# Patient Record
Sex: FEMALE | Race: WHITE | NOT HISPANIC OR LATINO | Employment: FULL TIME | URBAN - METROPOLITAN AREA
[De-identification: names, ages, dates, MRNs, and addresses within clinical notes are randomized per-mention and may not be internally consistent; named-entity substitution may affect disease eponyms.]

---

## 2017-04-11 PROBLEM — J35.3 ADENOTONSILLAR HYPERTROPHY: Status: ACTIVE | Noted: 2017-04-11

## 2018-01-04 PROBLEM — J35.3 ADENOTONSILLAR HYPERTROPHY: Status: RESOLVED | Noted: 2017-04-11 | Resolved: 2018-01-04

## 2018-01-04 PROBLEM — E28.2 PCOS (POLYCYSTIC OVARIAN SYNDROME): Status: ACTIVE | Noted: 2018-01-04

## 2018-03-29 ENCOUNTER — OFFICE VISIT (OUTPATIENT)
Dept: URGENT CARE | Facility: CLINIC | Age: 23
End: 2018-03-29
Payer: COMMERCIAL

## 2018-03-29 VITALS
OXYGEN SATURATION: 99 % | BODY MASS INDEX: 30.82 KG/M2 | TEMPERATURE: 98 F | WEIGHT: 185 LBS | SYSTOLIC BLOOD PRESSURE: 136 MMHG | HEART RATE: 75 BPM | DIASTOLIC BLOOD PRESSURE: 89 MMHG | HEIGHT: 65 IN | RESPIRATION RATE: 16 BRPM

## 2018-03-29 DIAGNOSIS — J30.1 CHRONIC SEASONAL ALLERGIC RHINITIS DUE TO POLLEN: Primary | ICD-10-CM

## 2018-03-29 PROCEDURE — 96372 THER/PROPH/DIAG INJ SC/IM: CPT | Mod: S$GLB,,, | Performed by: FAMILY MEDICINE

## 2018-03-29 PROCEDURE — 99213 OFFICE O/P EST LOW 20 MIN: CPT | Mod: 25,S$GLB,, | Performed by: NURSE PRACTITIONER

## 2018-03-29 RX ORDER — FLUTICASONE PROPIONATE 50 MCG
2 SPRAY, SUSPENSION (ML) NASAL DAILY
Qty: 1 BOTTLE | Refills: 1 | Status: SHIPPED | OUTPATIENT
Start: 2018-03-29

## 2018-03-29 RX ORDER — BETAMETHASONE SODIUM PHOSPHATE AND BETAMETHASONE ACETATE 3; 3 MG/ML; MG/ML
6 INJECTION, SUSPENSION INTRA-ARTICULAR; INTRALESIONAL; INTRAMUSCULAR; SOFT TISSUE
Status: COMPLETED | OUTPATIENT
Start: 2018-03-29 | End: 2018-03-29

## 2018-03-29 RX ORDER — PROMETHAZINE HYDROCHLORIDE 6.25 MG/5ML
6.25 SYRUP ORAL
Qty: 120 ML | Refills: 1 | Status: SHIPPED | OUTPATIENT
Start: 2018-03-29 | End: 2018-04-12

## 2018-03-29 RX ADMIN — BETAMETHASONE SODIUM PHOSPHATE AND BETAMETHASONE ACETATE 6 MG: 3; 3 INJECTION, SUSPENSION INTRA-ARTICULAR; INTRALESIONAL; INTRAMUSCULAR; SOFT TISSUE at 12:03

## 2018-03-29 NOTE — PROGRESS NOTES
"Subjective:       Patient ID: Trista Ayala is a 22 y.o. female.    Vitals:  height is 5' 5" (1.651 m) and weight is 83.9 kg (185 lb). Her temperature is 98 °F (36.7 °C). Her blood pressure is 136/89 and her pulse is 75. Her respiration is 16 and oxygen saturation is 99%.     Chief Complaint: URI (pt said she has been sick for a few weeks)    flonase in the past, states she has very bad sinus issues. Trying to avoid sinus surgery.  Has used benadryl at night to help sleep, cough worse at night      URI    This is a new problem. The current episode started 1 to 4 weeks ago. The problem has been unchanged. There has been no fever. Associated symptoms include congestion, coughing, ear pain, nausea, rhinorrhea and a sore throat. Pertinent negatives include no abdominal pain, chest pain, headaches, sinus pain or wheezing. She has tried antihistamine and decongestant for the symptoms. The treatment provided no relief.     Review of Systems   Constitution: Negative for chills, fever and malaise/fatigue.   HENT: Positive for congestion, ear pain, rhinorrhea and sore throat. Negative for hoarse voice and sinus pain.    Eyes: Negative for discharge and redness.   Cardiovascular: Negative for chest pain, dyspnea on exertion and leg swelling.   Respiratory: Positive for cough and sputum production. Negative for shortness of breath and wheezing.    Musculoskeletal: Negative for myalgias.   Gastrointestinal: Positive for nausea. Negative for abdominal pain.   Neurological: Negative for headaches.       Objective:      Physical Exam   Constitutional: She is oriented to person, place, and time. She appears well-developed and well-nourished. She is cooperative.  Non-toxic appearance. She does not appear ill. No distress.   HENT:   Head: Normocephalic and atraumatic.   Right Ear: Hearing, external ear and ear canal normal. Tympanic membrane is bulging. A middle ear effusion is present.   Left Ear: Hearing, external ear and " ear canal normal. A middle ear effusion is present.   Nose: Nose normal. No mucosal edema, rhinorrhea or nasal deformity. No epistaxis. Right sinus exhibits no maxillary sinus tenderness and no frontal sinus tenderness. Left sinus exhibits no maxillary sinus tenderness and no frontal sinus tenderness.   Mouth/Throat: Uvula is midline and mucous membranes are normal. No trismus in the jaw. Normal dentition. No uvula swelling. Posterior oropharyngeal erythema present.   Eyes: Conjunctivae and lids are normal. No scleral icterus.   Sclera clear bilat   Neck: Trachea normal, full passive range of motion without pain and phonation normal. Neck supple.   Cardiovascular: Normal rate, regular rhythm, normal heart sounds, intact distal pulses and normal pulses.    Pulmonary/Chest: Effort normal and breath sounds normal. No respiratory distress.   Abdominal: Soft. Normal appearance and bowel sounds are normal. She exhibits no distension. There is no tenderness.   Musculoskeletal: Normal range of motion. She exhibits no edema or deformity.   Neurological: She is alert and oriented to person, place, and time. She exhibits normal muscle tone. Coordination normal.   Skin: Skin is warm, dry and intact. She is not diaphoretic. No pallor.   Psychiatric: She has a normal mood and affect. Her speech is normal and behavior is normal. Judgment and thought content normal. Cognition and memory are normal.   Nursing note and vitals reviewed.      Assessment:       1. Chronic seasonal allergic rhinitis due to pollen        Plan:         Chronic seasonal allergic rhinitis due to pollen    Other orders  -     fluticasone (FLONASE) 50 mcg/actuation nasal spray; 2 sprays (100 mcg total) by Each Nare route once daily.  Dispense: 1 Bottle; Refill: 1  -     promethazine (PHENERGAN) 6.25 mg/5 mL syrup; Take 5 mLs (6.25 mg total) by mouth every 4 to 6 hours as needed for Nausea (cough).  Dispense: 120 mL; Refill: 1  -     betamethasone  acetate-betamethasone sodium phosphate injection 6 mg; Inject 1 mL (6 mg total) into the muscle one time.    discussed home relief and she may need to follow with ENT if symptoms persist

## 2018-03-29 NOTE — PATIENT INSTRUCTIONS
Symptomatic treatment: (consider the following unless you are allergic or cannot take the following suggestions)  Alternate Tylenol and Ibuprofen every 3 hrs for pain and fever control   For a sore throat try salt water gargles, honey/lemon water to soothe throat  Cepachol helps to numb the discomfort in throat  Cold-eeze helps to reduce the duration of Upper Respiratory Infection symptoms  Elderberry to reduce duration of URI symptoms  Nasal saline spray reduces inflammation and dryness  Warm face compresses/hot showers as often as you can to open sinuses   Vicks vapor rub at night  Flonase OTC or Nasacort OTC (nasal steroid)  Stay hydrated with increased water intake and simple foods like chicken noodle soup help hydrate and soothe the throat  Drink pedialyte, gatorade or propel.   Delsym helps with coughing at night  Zyrtec/Claritin during the day   Benadryl at night may help if allergy component   You can try breathe right strips at night to help you breathe   A cool mist humidifier in bedroom may help with cough and relieve stuffy nose.   Zantac will help if there is reflux from the post nasal drip  REST ReSt Rest as much as you can =)    Sinus rinses DO NOT USE TAP WATER, if you must, water must be a rolling boil for 1 minute, let it cool, then use.  May use distilled water, or over the counter nasal saline rinses.  Vics vapor rub in shower to help open nasal passages.  May use nasal gel to keep passages moisturized.  May use Nasal saline sprays during the day for added relief of congestion.   For those who go to the gym, please do not use the sauna or steam room now to clear sinuses.    During pollen season, change shirt if you are outside for a while when you go in.  Also wash your face.  Do not touch your face with your hands.  Wash your hands often in general while ill, avoid face contact with hands.     Your symptoms will likely last 5-7 days, maybe longer depending on how it affects your body.  You are  contagious 5-7, so minimize contact with others to reduce the spread to others and stay home from work or school as we discussed. Dehydration is preventable but is one of the main reasons why you will feel so badly.  Antibiotics are not needed unless a complication (such as Otitis Media, Bacterial sinus infection or pneumonia). Taking antibiotics for Flu/Cold is not supported by evidencebased medicine and can expose you to unnecessary side effects of the medication, such as anaphylaxis.   If you experience any:  Chest pain, shortness of breath, wheezing or difficulty breathing  Severe headache, face, neck or ear pain  New rash  Fever over 101.5º F (38.6 C) for more than three days  Confusion, behavior change or seizure  Severe weakness or dizziness  Go to ER

## 2018-08-15 PROBLEM — H60.92 OTITIS EXTERNA OF LEFT EAR: Status: ACTIVE | Noted: 2018-08-15

## 2018-08-15 PROBLEM — H72.92 PERFORATION OF LEFT TYMPANIC MEMBRANE: Status: ACTIVE | Noted: 2018-08-15

## 2018-08-15 PROBLEM — H65.02 ACUTE SEROUS OTITIS MEDIA OF LEFT EAR: Status: ACTIVE | Noted: 2018-08-15

## 2022-08-29 PROBLEM — Z34.90 ENCOUNTER FOR PLANNED INDUCTION OF LABOR: Status: ACTIVE | Noted: 2022-08-29

## 2022-11-20 PROBLEM — J45.990 ASTHMA, EXERCISE INDUCED: Status: ACTIVE | Noted: 2022-11-20

## 2023-03-10 ENCOUNTER — OFFICE VISIT (OUTPATIENT)
Dept: FAMILY MEDICINE | Facility: CLINIC | Age: 28
End: 2023-03-10
Payer: COMMERCIAL

## 2023-03-10 ENCOUNTER — LAB VISIT (OUTPATIENT)
Dept: LAB | Facility: HOSPITAL | Age: 28
End: 2023-03-10
Payer: COMMERCIAL

## 2023-03-10 VITALS
BODY MASS INDEX: 36.8 KG/M2 | HEIGHT: 65 IN | HEART RATE: 80 BPM | SYSTOLIC BLOOD PRESSURE: 124 MMHG | OXYGEN SATURATION: 96 % | DIASTOLIC BLOOD PRESSURE: 82 MMHG | WEIGHT: 220.88 LBS

## 2023-03-10 DIAGNOSIS — R53.83 FATIGUE, UNSPECIFIED TYPE: ICD-10-CM

## 2023-03-10 DIAGNOSIS — R53.83 FATIGUE, UNSPECIFIED TYPE: Primary | ICD-10-CM

## 2023-03-10 DIAGNOSIS — F41.9 ANXIETY: ICD-10-CM

## 2023-03-10 PROCEDURE — 3008F BODY MASS INDEX DOCD: CPT | Mod: CPTII,S$GLB,, | Performed by: PHYSICIAN ASSISTANT

## 2023-03-10 PROCEDURE — 3008F PR BODY MASS INDEX (BMI) DOCUMENTED: ICD-10-PCS | Mod: CPTII,S$GLB,, | Performed by: PHYSICIAN ASSISTANT

## 2023-03-10 PROCEDURE — 82728 ASSAY OF FERRITIN: CPT | Performed by: PHYSICIAN ASSISTANT

## 2023-03-10 PROCEDURE — 3074F SYST BP LT 130 MM HG: CPT | Mod: CPTII,S$GLB,, | Performed by: PHYSICIAN ASSISTANT

## 2023-03-10 PROCEDURE — 99999 PR PBB SHADOW E&M-EST. PATIENT-LVL III: CPT | Mod: PBBFAC,,, | Performed by: PHYSICIAN ASSISTANT

## 2023-03-10 PROCEDURE — 36415 COLL VENOUS BLD VENIPUNCTURE: CPT | Mod: PO | Performed by: PHYSICIAN ASSISTANT

## 2023-03-10 PROCEDURE — 84443 ASSAY THYROID STIM HORMONE: CPT | Performed by: PHYSICIAN ASSISTANT

## 2023-03-10 PROCEDURE — 80053 COMPREHEN METABOLIC PANEL: CPT | Performed by: PHYSICIAN ASSISTANT

## 2023-03-10 PROCEDURE — 85025 COMPLETE CBC W/AUTO DIFF WBC: CPT | Performed by: PHYSICIAN ASSISTANT

## 2023-03-10 PROCEDURE — 99204 OFFICE O/P NEW MOD 45 MIN: CPT | Mod: S$GLB,,, | Performed by: PHYSICIAN ASSISTANT

## 2023-03-10 PROCEDURE — 3079F PR MOST RECENT DIASTOLIC BLOOD PRESSURE 80-89 MM HG: ICD-10-PCS | Mod: CPTII,S$GLB,, | Performed by: PHYSICIAN ASSISTANT

## 2023-03-10 PROCEDURE — 80061 LIPID PANEL: CPT | Performed by: PHYSICIAN ASSISTANT

## 2023-03-10 PROCEDURE — 3074F PR MOST RECENT SYSTOLIC BLOOD PRESSURE < 130 MM HG: ICD-10-PCS | Mod: CPTII,S$GLB,, | Performed by: PHYSICIAN ASSISTANT

## 2023-03-10 PROCEDURE — 99204 PR OFFICE/OUTPT VISIT, NEW, LEVL IV, 45-59 MIN: ICD-10-PCS | Mod: S$GLB,,, | Performed by: PHYSICIAN ASSISTANT

## 2023-03-10 PROCEDURE — 1159F MED LIST DOCD IN RCRD: CPT | Mod: CPTII,S$GLB,, | Performed by: PHYSICIAN ASSISTANT

## 2023-03-10 PROCEDURE — 3079F DIAST BP 80-89 MM HG: CPT | Mod: CPTII,S$GLB,, | Performed by: PHYSICIAN ASSISTANT

## 2023-03-10 PROCEDURE — 99999 PR PBB SHADOW E&M-EST. PATIENT-LVL III: ICD-10-PCS | Mod: PBBFAC,,, | Performed by: PHYSICIAN ASSISTANT

## 2023-03-10 PROCEDURE — 84466 ASSAY OF TRANSFERRIN: CPT | Performed by: PHYSICIAN ASSISTANT

## 2023-03-10 PROCEDURE — 1159F PR MEDICATION LIST DOCUMENTED IN MEDICAL RECORD: ICD-10-PCS | Mod: CPTII,S$GLB,, | Performed by: PHYSICIAN ASSISTANT

## 2023-03-10 NOTE — PROGRESS NOTES
"Subjective:      Patient ID: Trista Negrete is a 27 y.o. female.    Chief Complaint: Fatigue (Since the baby he is 6 months, has gotten worse the past month)    Patient is new to me and clinic.    HPI  Patient has not had a recent PCP.    Patient reports right leg pain, but has resolved currently.    Patient reports worsening fatigue for a month.    Patient reports increased anxiety in certain instances.  Has noticed this in a car.   is noticing this.  She reports possible panic attacks with chest pain, elevated heart rate, and crying-happened last three nights ago.    Patient is breastfeeding currently.  No period since baby.    Not exercising daily.    Reviewed past medical, surgical, social, and family history with patient.    Review of Systems   Constitutional:  Positive for fatigue. Negative for appetite change, chills and fever.   Respiratory:  Negative for shortness of breath.    Cardiovascular:  Positive for chest pain (possible panic attacks).   Gastrointestinal:  Negative for abdominal pain, constipation, diarrhea, nausea and vomiting.   Neurological:  Negative for headaches.   Psychiatric/Behavioral:  Positive for decreased concentration. Negative for agitation, dysphoric mood, sleep disturbance and suicidal ideas. The patient is nervous/anxious.        Objective:   /82   Pulse 80   Ht 5' 5" (1.651 m)   Wt 100.2 kg (220 lb 14.4 oz)   SpO2 96%   Breastfeeding Yes   BMI 36.76 kg/m²     Physical Exam  Vitals reviewed.   Constitutional:       Appearance: Normal appearance. She is well-developed.   HENT:      Head: Normocephalic and atraumatic.      Right Ear: External ear normal.      Left Ear: External ear normal.   Eyes:      Conjunctiva/sclera: Conjunctivae normal.   Cardiovascular:      Rate and Rhythm: Normal rate and regular rhythm.      Heart sounds: Normal heart sounds. No murmur heard.    No friction rub. No gallop.   Pulmonary:      Effort: Pulmonary effort is normal. No " respiratory distress.      Breath sounds: Normal breath sounds. No wheezing, rhonchi or rales.   Musculoskeletal:         General: Normal range of motion.   Skin:     General: Skin is warm and dry.      Findings: No rash.   Neurological:      General: No focal deficit present.      Mental Status: She is alert and oriented to person, place, and time.   Psychiatric:         Mood and Affect: Mood normal.         Behavior: Behavior normal.         Thought Content: Thought content normal.         Judgment: Judgment normal.     Assessment:      1. Fatigue, unspecified type    2. Anxiety       Plan:   1. Fatigue, unspecified type  Bloodwork scheduled.  - CBC Auto Differential; Future  - Comprehensive Metabolic Panel; Future  - TSH; Future  - Lipid Panel; Future  - IRON AND TIBC; Future  - FERRITIN; Future    2. Anxiety  Continue to monitor.    Follow up in 2 weeks with me.  Patient agreed with plan and expressed understanding.    Thank you for allowing me to serve you,

## 2023-03-11 LAB
ALBUMIN SERPL BCP-MCNC: 3.8 G/DL (ref 3.5–5.2)
ALP SERPL-CCNC: 117 U/L (ref 55–135)
ALT SERPL W/O P-5'-P-CCNC: 17 U/L (ref 10–44)
ANION GAP SERPL CALC-SCNC: 9 MMOL/L (ref 8–16)
AST SERPL-CCNC: 14 U/L (ref 10–40)
BASOPHILS # BLD AUTO: 0.04 K/UL (ref 0–0.2)
BASOPHILS NFR BLD: 0.4 % (ref 0–1.9)
BILIRUB SERPL-MCNC: 0.3 MG/DL (ref 0.1–1)
BUN SERPL-MCNC: 11 MG/DL (ref 6–20)
CALCIUM SERPL-MCNC: 9.6 MG/DL (ref 8.7–10.5)
CHLORIDE SERPL-SCNC: 104 MMOL/L (ref 95–110)
CHOLEST SERPL-MCNC: 162 MG/DL (ref 120–199)
CHOLEST/HDLC SERPL: 2.5 {RATIO} (ref 2–5)
CO2 SERPL-SCNC: 25 MMOL/L (ref 23–29)
CREAT SERPL-MCNC: 0.8 MG/DL (ref 0.5–1.4)
DIFFERENTIAL METHOD: NORMAL
EOSINOPHIL # BLD AUTO: 0.1 K/UL (ref 0–0.5)
EOSINOPHIL NFR BLD: 0.6 % (ref 0–8)
ERYTHROCYTE [DISTWIDTH] IN BLOOD BY AUTOMATED COUNT: 13.7 % (ref 11.5–14.5)
EST. GFR  (NO RACE VARIABLE): >60 ML/MIN/1.73 M^2
FERRITIN SERPL-MCNC: 31 NG/ML (ref 20–300)
GLUCOSE SERPL-MCNC: 132 MG/DL (ref 70–110)
HCT VFR BLD AUTO: 40.5 % (ref 37–48.5)
HDLC SERPL-MCNC: 64 MG/DL (ref 40–75)
HDLC SERPL: 39.5 % (ref 20–50)
HGB BLD-MCNC: 13.2 G/DL (ref 12–16)
IMM GRANULOCYTES # BLD AUTO: 0.03 K/UL (ref 0–0.04)
IMM GRANULOCYTES NFR BLD AUTO: 0.3 % (ref 0–0.5)
IRON SERPL-MCNC: 53 UG/DL (ref 30–160)
LDLC SERPL CALC-MCNC: 72.2 MG/DL (ref 63–159)
LYMPHOCYTES # BLD AUTO: 3.2 K/UL (ref 1–4.8)
LYMPHOCYTES NFR BLD: 32.9 % (ref 18–48)
MCH RBC QN AUTO: 27.9 PG (ref 27–31)
MCHC RBC AUTO-ENTMCNC: 32.6 G/DL (ref 32–36)
MCV RBC AUTO: 86 FL (ref 82–98)
MONOCYTES # BLD AUTO: 0.6 K/UL (ref 0.3–1)
MONOCYTES NFR BLD: 6.4 % (ref 4–15)
NEUTROPHILS # BLD AUTO: 5.7 K/UL (ref 1.8–7.7)
NEUTROPHILS NFR BLD: 59.4 % (ref 38–73)
NONHDLC SERPL-MCNC: 98 MG/DL
NRBC BLD-RTO: 0 /100 WBC
PLATELET # BLD AUTO: 330 K/UL (ref 150–450)
PMV BLD AUTO: 11 FL (ref 9.2–12.9)
POTASSIUM SERPL-SCNC: 3.8 MMOL/L (ref 3.5–5.1)
PROT SERPL-MCNC: 7.4 G/DL (ref 6–8.4)
RBC # BLD AUTO: 4.73 M/UL (ref 4–5.4)
SATURATED IRON: 13 % (ref 20–50)
SODIUM SERPL-SCNC: 138 MMOL/L (ref 136–145)
TOTAL IRON BINDING CAPACITY: 398 UG/DL (ref 250–450)
TRANSFERRIN SERPL-MCNC: 269 MG/DL (ref 200–375)
TRIGL SERPL-MCNC: 129 MG/DL (ref 30–150)
TSH SERPL DL<=0.005 MIU/L-ACNC: 1.55 UIU/ML (ref 0.4–4)
WBC # BLD AUTO: 9.63 K/UL (ref 3.9–12.7)

## 2023-03-15 ENCOUNTER — PATIENT MESSAGE (OUTPATIENT)
Dept: FAMILY MEDICINE | Facility: CLINIC | Age: 28
End: 2023-03-15
Payer: COMMERCIAL

## 2023-03-19 PROBLEM — Z34.90 ENCOUNTER FOR PLANNED INDUCTION OF LABOR: Status: RESOLVED | Noted: 2022-08-29 | Resolved: 2023-03-19

## 2023-03-24 ENCOUNTER — OFFICE VISIT (OUTPATIENT)
Dept: FAMILY MEDICINE | Facility: CLINIC | Age: 28
End: 2023-03-24
Payer: COMMERCIAL

## 2023-03-24 ENCOUNTER — TELEPHONE (OUTPATIENT)
Dept: FAMILY MEDICINE | Facility: CLINIC | Age: 28
End: 2023-03-24
Payer: COMMERCIAL

## 2023-03-24 DIAGNOSIS — F41.9 ANXIETY: Primary | ICD-10-CM

## 2023-03-24 PROCEDURE — 99213 OFFICE O/P EST LOW 20 MIN: CPT | Mod: 95,,, | Performed by: PHYSICIAN ASSISTANT

## 2023-03-24 PROCEDURE — 99213 PR OFFICE/OUTPT VISIT, EST, LEVL III, 20-29 MIN: ICD-10-PCS | Mod: 95,,, | Performed by: PHYSICIAN ASSISTANT
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
## 2023-05-23 ENCOUNTER — TELEPHONE (OUTPATIENT)
Dept: FAMILY MEDICINE | Facility: CLINIC | Age: 28
End: 2023-05-23
Payer: COMMERCIAL

## 2023-09-22 ENCOUNTER — TELEPHONE (OUTPATIENT)
Dept: FAMILY MEDICINE | Facility: CLINIC | Age: 28
End: 2023-09-22
Payer: COMMERCIAL

## 2023-09-22 NOTE — TELEPHONE ENCOUNTER
Is there some hold spots  for these  canceled apt please let  me know I dont mine calling patient back .

## 2023-09-22 NOTE — TELEPHONE ENCOUNTER
Tried to call pt. No answer. Left message to return call   --need to agustin Monday appt.  will not be in clinic.

## 2023-11-13 ENCOUNTER — PATIENT MESSAGE (OUTPATIENT)
Dept: PSYCHIATRY | Facility: CLINIC | Age: 28
End: 2023-11-13
Payer: COMMERCIAL

## 2024-07-03 PROBLEM — O34.211 MATERNAL CARE DUE TO LOW TRANSVERSE UTERINE SCAR FROM PREVIOUS CESAREAN DELIVERY: Status: ACTIVE | Noted: 2024-07-03

## 2024-10-27 ENCOUNTER — OFFICE VISIT (OUTPATIENT)
Dept: URGENT CARE | Facility: CLINIC | Age: 29
End: 2024-10-27
Payer: COMMERCIAL

## 2024-10-27 VITALS
WEIGHT: 245 LBS | HEIGHT: 65 IN | TEMPERATURE: 98 F | BODY MASS INDEX: 40.82 KG/M2 | SYSTOLIC BLOOD PRESSURE: 138 MMHG | DIASTOLIC BLOOD PRESSURE: 87 MMHG | RESPIRATION RATE: 17 BRPM | HEART RATE: 86 BPM | OXYGEN SATURATION: 98 %

## 2024-10-27 DIAGNOSIS — U07.1 COVID-19 VIRUS INFECTION: Primary | ICD-10-CM

## 2024-10-27 DIAGNOSIS — R05.1 ACUTE COUGH: ICD-10-CM

## 2024-10-27 LAB
CTP QC/QA: YES
CTP QC/QA: YES
POC MOLECULAR INFLUENZA A AGN: NEGATIVE
POC MOLECULAR INFLUENZA B AGN: NEGATIVE
SARS-COV-2 AG RESP QL IA.RAPID: POSITIVE

## 2024-10-27 PROCEDURE — 87502 INFLUENZA DNA AMP PROBE: CPT | Mod: QW,S$GLB,, | Performed by: FAMILY MEDICINE

## 2024-10-27 PROCEDURE — 87811 SARS-COV-2 COVID19 W/OPTIC: CPT | Mod: QW,S$GLB,, | Performed by: FAMILY MEDICINE

## 2024-10-27 PROCEDURE — 99213 OFFICE O/P EST LOW 20 MIN: CPT | Mod: S$GLB,,, | Performed by: FAMILY MEDICINE

## 2024-10-27 RX ORDER — LEVOCETIRIZINE DIHYDROCHLORIDE 5 MG/1
5 TABLET, FILM COATED ORAL NIGHTLY
COMMUNITY

## 2024-11-02 ENCOUNTER — ON-DEMAND VIRTUAL (OUTPATIENT)
Dept: URGENT CARE | Facility: CLINIC | Age: 29
End: 2024-11-02
Payer: COMMERCIAL

## 2024-11-02 DIAGNOSIS — R05.9 COUGH, UNSPECIFIED TYPE: Primary | ICD-10-CM

## 2024-11-02 PROCEDURE — 99213 OFFICE O/P EST LOW 20 MIN: CPT | Mod: 95,,, | Performed by: NURSE PRACTITIONER

## 2024-11-02 RX ORDER — CEFDINIR 300 MG/1
300 CAPSULE ORAL EVERY 12 HOURS
Qty: 20 CAPSULE | Refills: 0 | Status: SHIPPED | OUTPATIENT
Start: 2024-11-02 | End: 2024-11-12

## 2025-04-01 ENCOUNTER — OFFICE VISIT (OUTPATIENT)
Dept: URGENT CARE | Facility: CLINIC | Age: 30
End: 2025-04-01
Payer: COMMERCIAL

## 2025-04-01 VITALS
SYSTOLIC BLOOD PRESSURE: 129 MMHG | RESPIRATION RATE: 18 BRPM | BODY MASS INDEX: 40.82 KG/M2 | HEART RATE: 82 BPM | OXYGEN SATURATION: 99 % | HEIGHT: 65 IN | DIASTOLIC BLOOD PRESSURE: 89 MMHG | TEMPERATURE: 98 F | WEIGHT: 245 LBS

## 2025-04-01 DIAGNOSIS — R42 DIZZINESS AFTER EXTENSION OF NECK: ICD-10-CM

## 2025-04-01 DIAGNOSIS — H83.01 LABYRINTHITIS OF RIGHT EAR: Primary | ICD-10-CM

## 2025-04-01 DIAGNOSIS — H65.91 MIDDLE EAR EFFUSION, RIGHT: ICD-10-CM

## 2025-04-01 PROCEDURE — 99213 OFFICE O/P EST LOW 20 MIN: CPT | Mod: S$GLB,,, | Performed by: PHYSICIAN ASSISTANT

## 2025-04-01 RX ORDER — OXYMETAZOLINE HCL 0.05 %
2 SPRAY, NON-AEROSOL (ML) NASAL 2 TIMES DAILY
Qty: 15 ML | Refills: 0 | Status: SHIPPED | OUTPATIENT
Start: 2025-04-01 | End: 2025-04-04

## 2025-04-01 NOTE — PROGRESS NOTES
"Subjective:      Patient ID: Trista Negrete is a 29 y.o. female.    Vitals:  height is 5' 5" (1.651 m) and weight is 111.1 kg (245 lb). Her oral temperature is 98.2 °F (36.8 °C). Her blood pressure is 129/89 and her pulse is 82. Her respiration is 18 and oxygen saturation is 99%.     Chief Complaint: Dizziness    Patient c/o dizziness onset last Tuesday. Pt stated she feels like she has to pop her ears. No pain    Dizziness:   Chronicity:  New  Onset:  In the past 7 days  Frequency:  Every few hours  Pain Scale:  0/10  Severity:  Mild  Duration:  1 minute  Dizziness characteristics:  Off-balance and lightheaded/impending faint   Associated symptoms: hearing loss, ear congestion and tinnitus.no ear pain, no fever, no headaches, no nausea, no vomiting, no diaphoresis, no aural fullness, no weakness, no visual disturbances, no light-headedness, no syncope, no palpitations, no panic, no facial weakness, no slurred speech, no numbness in extremities and no chest pain.  Aggravated by:  Getting up and position changes  Improvements on treatment:  No relief      Constitution: Negative for chills, sweating, fatigue and fever.   HENT:  Positive for tinnitus and hearing loss. Negative for ear pain, drooling, congestion, sore throat, trouble swallowing and voice change.    Neck: Negative for neck pain, neck stiffness, painful lymph nodes and neck swelling.   Cardiovascular:  Negative for chest pain, leg swelling, palpitations, sob on exertion and passing out.   Eyes:  Negative for eye pain, eye redness, photophobia, double vision, blurred vision and eyelid swelling.   Respiratory:  Negative for chest tightness, cough, sputum production, bloody sputum, shortness of breath, stridor and wheezing.    Gastrointestinal:  Negative for abdominal pain, abdominal bloating, nausea, vomiting, constipation, diarrhea and heartburn.   Musculoskeletal:  Negative for joint pain, joint swelling, abnormal ROM of joint, back pain, muscle " cramps and muscle ache.   Skin:  Negative for rash and hives.   Allergic/Immunologic: Negative for seasonal allergies, food allergies, hives, itching and sneezing.   Neurological:  Positive for dizziness. Negative for light-headedness, passing out, loss of balance, headaches, altered mental status, loss of consciousness and seizures.   Hematologic/Lymphatic: Negative for swollen lymph nodes.   Psychiatric/Behavioral:  Negative for altered mental status and nervous/anxious. The patient is not nervous/anxious.       Objective:     Physical Exam   Constitutional: She is oriented to person, place, and time. She appears well-developed. She is cooperative.   HENT:   Head: Normocephalic and atraumatic.   Ears:   Right Ear: Hearing, external ear and ear canal normal. Tympanic membrane is erythematous. A middle ear effusion is present.   Left Ear: Hearing, tympanic membrane, external ear and ear canal normal.   Nose: Nose normal. No mucosal edema or nasal deformity. No epistaxis. Right sinus exhibits no maxillary sinus tenderness and no frontal sinus tenderness. Left sinus exhibits no maxillary sinus tenderness and no frontal sinus tenderness.   Mouth/Throat: Uvula is midline, oropharynx is clear and moist and mucous membranes are normal. No trismus in the jaw. Normal dentition. No uvula swelling.   Eyes: Conjunctivae and lids are normal. Right eye exhibits no nystagmus. Left eye exhibits no nystagmus. Extraocular movement intact   Neck: Trachea normal and phonation normal. Neck supple.   Cardiovascular: Normal rate, regular rhythm, normal heart sounds and normal pulses.   Pulmonary/Chest: Effort normal and breath sounds normal.   Abdominal: Normal appearance and bowel sounds are normal. Soft.   Musculoskeletal: Normal range of motion.         General: Normal range of motion.   Neurological: She is alert and oriented to person, place, and time. She exhibits normal muscle tone.   Skin: Skin is warm, dry and intact.    Psychiatric: Her speech is normal and behavior is normal. Judgment and thought content normal.   Nursing note and vitals reviewed.      Assessment:     1. Labyrinthitis of right ear    2. Dizziness after extension of neck    3. Middle ear effusion, right        Plan:       Labyrinthitis of right ear  -     naproxen-pseudoephedrine 220-120 mg Tb12; Take 1 tablet by mouth once daily. for 10 days  Dispense: 12 each; Refill: 0    Dizziness after extension of neck  -     naproxen-pseudoephedrine 220-120 mg Tb12; Take 1 tablet by mouth once daily. for 10 days  Dispense: 12 each; Refill: 0    Middle ear effusion, right    Other orders  -     oxymetazoline (AFRIN) 0.05 % nasal spray; 2 sprays by Nasal route 2 (two) times daily. for 3 days  Dispense: 15 mL; Refill: 0        INSTRUCTIONS:  - Rest.  - Drink plenty of fluids.  - Take Tylenol and/or Ibuprofen as directed as needed for fever/pain.  Do not take more than the recommended dose.  - follow up with your PCP within the next 1-2 weeks as needed.  - You must understand that you have received an Urgent Care treatment only and that you may be released before all of your medical problems are known or treated.   - You, the patient, will arrange for follow up care as instructed.   - If your condition worsens or fails to improve we recommend that you receive another evaluation at the ER immediately or contact your PCP to discuss your concerns.   - You can call [phone] or [phone] to help schedule an appointment with the appropriate provider.     -If you smoke cigarettes, it would be beneficial for you to stop.

## 2025-04-14 ENCOUNTER — LAB VISIT (OUTPATIENT)
Dept: LAB | Facility: HOSPITAL | Age: 30
End: 2025-04-14
Payer: COMMERCIAL

## 2025-04-14 ENCOUNTER — OFFICE VISIT (OUTPATIENT)
Dept: FAMILY MEDICINE | Facility: CLINIC | Age: 30
End: 2025-04-14
Payer: COMMERCIAL

## 2025-04-14 VITALS
OXYGEN SATURATION: 97 % | HEART RATE: 91 BPM | DIASTOLIC BLOOD PRESSURE: 90 MMHG | SYSTOLIC BLOOD PRESSURE: 124 MMHG | BODY MASS INDEX: 42.98 KG/M2 | WEIGHT: 258.25 LBS

## 2025-04-14 DIAGNOSIS — H60.93 OTITIS EXTERNA OF BOTH EARS, UNSPECIFIED CHRONICITY, UNSPECIFIED TYPE: ICD-10-CM

## 2025-04-14 DIAGNOSIS — G43.009 MIGRAINE WITHOUT AURA AND WITHOUT STATUS MIGRAINOSUS, NOT INTRACTABLE: ICD-10-CM

## 2025-04-14 DIAGNOSIS — J30.9 ALLERGIC RHINITIS, UNSPECIFIED SEASONALITY, UNSPECIFIED TRIGGER: Primary | ICD-10-CM

## 2025-04-14 DIAGNOSIS — Z00.00 PREVENTATIVE HEALTH CARE: ICD-10-CM

## 2025-04-14 DIAGNOSIS — E66.01 SEVERE OBESITY: ICD-10-CM

## 2025-04-14 LAB
ABSOLUTE EOSINOPHIL (OHS): 0.11 K/UL
ABSOLUTE MONOCYTE (OHS): 0.64 K/UL (ref 0.3–1)
ABSOLUTE NEUTROPHIL COUNT (OHS): 6.75 K/UL (ref 1.8–7.7)
ALBUMIN SERPL BCP-MCNC: 3.3 G/DL (ref 3.5–5.2)
ALP SERPL-CCNC: 95 UNIT/L (ref 40–150)
ALT SERPL W/O P-5'-P-CCNC: 22 UNIT/L (ref 10–44)
ANION GAP (OHS): 8 MMOL/L (ref 8–16)
AST SERPL-CCNC: 19 UNIT/L (ref 11–45)
BASOPHILS # BLD AUTO: 0.06 K/UL
BASOPHILS NFR BLD AUTO: 0.6 %
BILIRUB SERPL-MCNC: 0.3 MG/DL (ref 0.1–1)
BUN SERPL-MCNC: 8 MG/DL (ref 6–20)
CALCIUM SERPL-MCNC: 9.2 MG/DL (ref 8.7–10.5)
CHLORIDE SERPL-SCNC: 107 MMOL/L (ref 95–110)
CHOLEST SERPL-MCNC: 154 MG/DL (ref 120–199)
CHOLEST/HDLC SERPL: 3.4 {RATIO} (ref 2–5)
CO2 SERPL-SCNC: 26 MMOL/L (ref 23–29)
CREAT SERPL-MCNC: 0.6 MG/DL (ref 0.5–1.4)
EAG (OHS): 100 MG/DL (ref 68–131)
ERYTHROCYTE [DISTWIDTH] IN BLOOD BY AUTOMATED COUNT: 12.9 % (ref 11.5–14.5)
GFR SERPLBLD CREATININE-BSD FMLA CKD-EPI: >60 ML/MIN/1.73/M2
GLUCOSE SERPL-MCNC: 97 MG/DL (ref 70–110)
HBA1C MFR BLD: 5.1 % (ref 4–5.6)
HCT VFR BLD AUTO: 40.5 % (ref 37–48.5)
HDLC SERPL-MCNC: 45 MG/DL (ref 40–75)
HDLC SERPL: 29.2 % (ref 20–50)
HGB BLD-MCNC: 13.3 GM/DL (ref 12–16)
IMM GRANULOCYTES # BLD AUTO: 0.06 K/UL (ref 0–0.04)
IMM GRANULOCYTES NFR BLD AUTO: 0.6 % (ref 0–0.5)
LDLC SERPL CALC-MCNC: 69.6 MG/DL (ref 63–159)
LYMPHOCYTES # BLD AUTO: 3.18 K/UL (ref 1–4.8)
MCH RBC QN AUTO: 29.2 PG (ref 27–31)
MCHC RBC AUTO-ENTMCNC: 32.8 G/DL (ref 32–36)
MCV RBC AUTO: 89 FL (ref 82–98)
NONHDLC SERPL-MCNC: 109 MG/DL
NUCLEATED RBC (/100WBC) (OHS): 0 /100 WBC
PLATELET # BLD AUTO: 341 K/UL (ref 150–450)
PMV BLD AUTO: 11 FL (ref 9.2–12.9)
POTASSIUM SERPL-SCNC: 4.4 MMOL/L (ref 3.5–5.1)
PROT SERPL-MCNC: 7.4 GM/DL (ref 6–8.4)
RBC # BLD AUTO: 4.55 M/UL (ref 4–5.4)
RELATIVE EOSINOPHIL (OHS): 1 %
RELATIVE LYMPHOCYTE (OHS): 29.4 % (ref 18–48)
RELATIVE MONOCYTE (OHS): 5.9 % (ref 4–15)
RELATIVE NEUTROPHIL (OHS): 62.5 % (ref 38–73)
SODIUM SERPL-SCNC: 141 MMOL/L (ref 136–145)
TRIGL SERPL-MCNC: 197 MG/DL (ref 30–150)
TSH SERPL-ACNC: 1.99 UIU/ML (ref 0.4–4)
WBC # BLD AUTO: 10.8 K/UL (ref 3.9–12.7)

## 2025-04-14 PROCEDURE — 36415 COLL VENOUS BLD VENIPUNCTURE: CPT | Mod: PO

## 2025-04-14 PROCEDURE — 85025 COMPLETE CBC W/AUTO DIFF WBC: CPT

## 2025-04-14 PROCEDURE — 99999 PR PBB SHADOW E&M-EST. PATIENT-LVL IV: CPT | Mod: PBBFAC,,,

## 2025-04-14 PROCEDURE — 84443 ASSAY THYROID STIM HORMONE: CPT

## 2025-04-14 PROCEDURE — 83036 HEMOGLOBIN GLYCOSYLATED A1C: CPT

## 2025-04-14 PROCEDURE — 80053 COMPREHEN METABOLIC PANEL: CPT

## 2025-04-14 PROCEDURE — 82465 ASSAY BLD/SERUM CHOLESTEROL: CPT

## 2025-04-14 RX ORDER — AZELASTINE 1 MG/ML
1 SPRAY, METERED NASAL 2 TIMES DAILY PRN
Qty: 30 ML | Refills: 0 | Status: SHIPPED | OUTPATIENT
Start: 2025-04-14 | End: 2026-04-14

## 2025-04-14 RX ORDER — CIPROFLOXACIN AND DEXAMETHASONE 3; 1 MG/ML; MG/ML
4 SUSPENSION/ DROPS AURICULAR (OTIC) 2 TIMES DAILY
Qty: 7.5 ML | Refills: 0 | Status: SHIPPED | OUTPATIENT
Start: 2025-04-14 | End: 2025-04-21

## 2025-04-14 RX ORDER — FLUTICASONE PROPIONATE 50 MCG
1 SPRAY, SUSPENSION (ML) NASAL DAILY PRN
Qty: 11.1 ML | Refills: 0 | Status: SHIPPED | OUTPATIENT
Start: 2025-04-14

## 2025-04-14 NOTE — PROGRESS NOTES
Patient ID: Trista Negrete is a 29 y.o. female.    Chief Complaint: Establish Care    History of Present Illness    HPI:  Trista presents for an annual wellness visit and to discuss concerns about high blood pressure and recurring headaches. Trista, a 9-month postpartum mother, reports concerns about her blood pressure. Her diastolic pressure is usually around 90 mmHg when checked at urgent care visits since giving birth, while her systolic pressure typically ranges between 120 and 130 mmHg. She was previously on Labetalol 100 mg, discontinued by her OB at about 6 weeks postpartum.    She reports headaches occurring at least once a week, lasting for 1-2 days. These headaches have been consistent since mid-pregnancy, more frequent than her previous occurrences. Associated symptoms include photophobia, phonophobia, nausea, and dizziness. Tylenol and ibuprofen provide inconsistent relief. She notes severe seasonal allergies, which may contribute to her headaches.    Trista complains of ear pain, particularly in the right ear, with a sensation of needing to equalize pressure. She was evaluated at urgent care 2 weeks ago for dizziness, attributed to fluid in her ear. She has a history of recurrent ear infections as a child and had ear tubes placed multiple times.    She denies any swollen lymph nodes in the neck area, pruritus in the ear, or muffled hearing.    MEDICATIONS:  Trista is on Labetalol 100 mg, which was discontinued approximately 6 weeks postpartum. She also uses an Albuterol inhaler as needed, typically when she is very sick.    MEDICAL HISTORY:  Trista has a history of PCOS (Polycystic ovary syndrome) and seasonal allergies. Trista has been pregnant twice (G2) and has given birth twice (P2). Both deliveries were via . She has a lactation history of 9 months.    FAMILY HISTORY:  Family history is significant for her paternal grandmother having colon cancer in her 90s. Her maternal  grandmother had breast cancer in her 60s. Trista's paternal aunt had breast cancer at age 50, lung cancer, a brain tumor, and two other cancers.    SURGICAL HISTORY:  She has undergone wisdom teeth extraction, tonsillectomy and adenoidectomy, two C-sections, and a cholecystectomy (gallbladder removal).    ALLERGIES:  Trista is allergic to Neosporin. She may also be allergic to Penicillin, as she experienced a rash as a child, but she is not 100% certain about this allergy.    SOCIAL HISTORY:  Occupation: Third grade math and      History:  is in the       ROS:  ENT: reports ear pain, reports ear pressure  Cardiovascular: denies lower extremity edema  Gastrointestinal: reports nausea  Neurological: reports headache, reports dizziness  Allergic: reports seasonal allergies         Problem List[1]    Medications Ordered Prior to Encounter[2]    Past Medical History:   Diagnosis Date    Migraine     Mononucleosis     PCOS (polycystic ovarian syndrome)     Strep throat        Past Surgical History:   Procedure Laterality Date    ADENOIDECTOMY       SECTION N/A 2022    Procedure:  SECTION;  Surgeon: Tracy Allen MD;  Location: [facility] L&D;  Service: OB/GYN;  Laterality: N/A;     SECTION N/A 2024    Procedure:  SECTION;  Surgeon: Tracy Allen MD;  Location: [facility] L&D;  Service: OB/GYN;  Laterality: N/A;     SECTION  22    CHOLECYSTECTOMY      TONSILLECTOMY      WISDOM TOOTH EXTRACTION          Family History   Problem Relation Name Age of Onset    Asthma Mother Marcella     Hypertension Father Jeff     No Known Problems Sister      Cancer Maternal Grandmother Trena         breast    Stroke Maternal Grandfather Nader     Cancer Paternal Grandmother [place] 90        colon    Hypertension Paternal Grandmother [place]     Stroke Paternal Grandmother Jeannie     Cancer Paternal Grandfather Mana     No Known Problems Son       Cancer Paternal Aunt Isatu         multiple- lung, brain    Breast cancer Paternal Aunt Isatu 50       Social History[3]    Review of patient's allergies indicates:   Allergen Reactions    Neosporin (auk-oxf-[id number]) [neomycin-bacitracnzn-polymyxnb]     Penicillins Rash        Health Maintenance Due   Topic Date Due    Hepatitis C Screening  Never done    HIV Screening  Never done    Influenza Vaccine (1) 09/01/2024    COVID-19 Vaccine (3 - 2024-25 season) 09/01/2024       Objective     Vitals:    04/14/25 0934   BP: (!) 124/90   Pulse: 91      Body mass index is 42.98 kg/m².     Physical Exam  Vitals and nursing note reviewed.   Constitutional:       General: She is not in acute distress.     Appearance: Normal appearance. She is not ill-appearing or toxic-appearing.   HENT:      Head: Normocephalic and atraumatic.      Nose: Nose normal.      Mouth/Throat:      Mouth: Mucous membranes are moist.   Eyes:      Extraocular Movements: Extraocular movements intact.   Cardiovascular:      Rate and Rhythm: Normal rate and regular rhythm.      Heart sounds: Normal heart sounds. No murmur heard.     No friction rub. No gallop.   Pulmonary:      Effort: Pulmonary effort is normal.      Breath sounds: Normal breath sounds. No wheezing, rhonchi or rales.   Musculoskeletal:      Cervical back: Neck supple.      Right lower leg: No edema.      Left lower leg: No edema.   Skin:     General: Skin is warm.   Neurological:      Mental Status: She is alert and oriented to person, place, and time.   Psychiatric:         Mood and Affect: Mood normal.         Behavior: Behavior normal.         Assessment & Plan    Considered family history of breast cancer and recommended regular breast self-exams while showering.  Evaluated reported elevated diastolic BP (around 90) and decided to monitor before potentially restarting low-dose Labetalol.  Assessed frequent headaches, considering potential causes including allergies, vision  changes, and possible migraines.  Examined ears due to complaint of pain and history of recurrent infections, noted redness and fluid.  Will treat possible ear infection with antibiotic ear drops, considering it as potential cause of headaches.    SEVERE OBESITY:  - Monitor patient's blood pressure, noting typical readings of 120-130/90.  - Instruct patient to check blood pressure at home multiple times daily for a few days to assess consistency of high readings.  - If blood pressure remains consistently above 130/80, consider prescribing low-dose Labetalol.  - Note that patient was previously on Labetalol 100 mg but was taken off by OB at 6 weeks postpartum.  - Advise patient to report if blood pressure consistently exceeds 130/80.    MIGRAINE:  - Trista reports headaches occurring once a week, lasting 1-2 days, with associated nausea, dizziness, and photosensitivity.  - Evaluate as migraine pattern (less than 15 days/month).  - Consider potential causes including allergies, vision changes, and otitis media.  - Prescribe Ondansetron for nausea.  - Refer to Neurology for evaluation and to Ophthalmology for routine exam to rule out vision abnormality as etiology.    OTITIS EXTERNA:  - Trista reports otalgia, ear popping sensation, and history of recurrent otitis media in childhood.  - Examination shows erythema and fluid in both ears, with minimal scar tissue in the right ear.  - Assess as otitis externa requiring antibiotic treatment.  - Prescribe Ciprofloxacin/Dexamethasone otic drops, 4 drops twice daily in both ears for 7 days (can discontinue after 5 days if symptoms improve).    POLYCYSTIC OVARIAN SYNDROME:  - Trista reports irregular menses due to PCOS and lactation.    SEASONAL ALLERGIC RHINITIS:  - Trista reports severe seasonal allergies, which may contribute to headaches.  - Prescribe Fluticasone and Azelastine nasal sprays, both to be administered daily.    FAMILY HISTORY OF BREAST CANCER:  - Family  history includes maternal grandmother (diagnosed in 60s) and paternal aunt (diagnosed at 50).  - Acknowledge increased risk and recommend closer monitoring.  - Advise regular breast self-exams, reporting of persistent changes, and mammogram screening starting at age 40, with possibility of earlier screening if needed.    FAMILY HISTORY OF COLON CANCER:  - Paternal grandmother diagnosed in her 90s.  - Acknowledge family history but find it reassuring due to late age of onset.    DRUG ALLERGIES:  - Trista reports penicillin allergy from childhood (rash), and allergies to Neomycin/Polymyxin B/Bacitracin and sulfur.  - Consider these allergies when prescribing medications.    FOLLOW-UP AND MONITORING:  - Ordered routine labs: CBC, metabolic panel, TSH, and hemoglobin A1c.  - Follow up for lab work and referrals.         Allergic rhinitis, unspecified seasonality, unspecified trigger  -     fluticasone propionate (FLONASE) 50 mcg/actuation nasal spray; 1 spray (50 mcg total) by Each Nostril route daily as needed for Rhinitis or Allergies.  Dispense: 11.1 mL; Refill: 0  -     azelastine (ASTELIN) 137 mcg (0.1 %) nasal spray; 1 spray (137 mcg total) by Nasal route 2 (two) times daily as needed for Rhinitis.  Dispense: 30 mL; Refill: 0  -     ciprofloxacin-dexAMETHasone 0.3-0.1% (CIPRODEX) 0.3-0.1 % DrpS; Place 4 drops into both ears 2 (two) times daily. for 7 days  Dispense: 7.5 mL; Refill: 0    Migraine without aura and without status migrainosus, not intractable  -     Ambulatory referral/consult to Neurology; Future; Expected date: 04/21/2025  -     Ambulatory referral/consult to Optometry; Future; Expected date: 04/21/2025    Preventative health care  -     Ambulatory referral/consult to Optometry; Future; Expected date: 04/21/2025  -     Comprehensive Metabolic Panel; Future; Expected date: 04/14/2025  -     TSH; Future; Expected date: 04/14/2025  -     Hemoglobin A1C; Future; Expected date: 04/14/2025  -     Lipid  Panel; Future; Expected date: 2025  -     CBC Auto Differential; Future; Expected date: 2025    Severe obesity    Otitis externa of both ears, unspecified chronicity, unspecified type  -     ciprofloxacin-dexAMETHasone 0.3-0.1% (CIPRODEX) 0.3-0.1 % DrpS; Place 4 drops into both ears 2 (two) times daily. for 7 days  Dispense: 7.5 mL; Refill: 0        Follow up if symptoms worsen or fail to improve.    This note was generated with the assistance of ambient listening technology. Verbal consent was obtained by the patient and accompanying visitor(s) for the recording of patient appointment to facilitate this note. I attest to having reviewed and edited the generated note for accuracy, though some syntax or spelling errors may persist. Please contact the author of this note for any clarification.        Gloria Gonzalez MD  04/15/2025          [1]   Patient Active Problem List  Diagnosis    PCOS (polycystic ovarian syndrome)    Chronic seasonal allergic rhinitis due to pollen    Otitis externa of left ear    Perforation of left tympanic membrane    Acute serous otitis media of left ear    Asthma, exercise induced    Maternal care due to low transverse uterine scar from previous  delivery    Severe obesity   [2]   Current Outpatient Medications on File Prior to Visit   Medication Sig Dispense Refill    levocetirizine (XYZAL) 5 MG tablet Take 5 mg by mouth every evening.      albuterol (PROVENTIL HFA) 90 mcg/actuation inhaler Inhale 2 puffs into the lungs every 6 (six) hours as needed for Wheezing. Rescue 18 g 0     No current facility-administered medications on file prior to visit.   [3]   Social History  Socioeconomic History    Marital status:      Spouse name: Edmund    Number of children: 1   Occupational History    Occupation: medical assistant   Tobacco Use    Smoking status: Never    Smokeless tobacco: Never   Substance and Sexual Activity    Alcohol use: Not Currently     Comment:  Occasional    Drug use: No    Sexual activity: Yes     Partners: Male   Social History Narrative    Lives with spouse and son.  From here.     Social Drivers of Health     Food Insecurity: No Food Insecurity (7/4/2024)    Hunger Vital Sign     Worried About Running Out of Food in the Last Year: Never true     Ran Out of Food in the Last Year: Never true   Transportation Needs: No Transportation Needs (7/4/2024)    TRANSPORTATION NEEDS     Transportation : No   Housing Stability: Unknown (7/4/2024)    Housing Stability Vital Sign     Unable to Pay for Housing in the Last Year: No     Homeless in the Last Year: No

## 2025-04-18 ENCOUNTER — RESULTS FOLLOW-UP (OUTPATIENT)
Dept: FAMILY MEDICINE | Facility: CLINIC | Age: 30
End: 2025-04-18

## 2025-04-24 ENCOUNTER — PATIENT MESSAGE (OUTPATIENT)
Dept: FAMILY MEDICINE | Facility: CLINIC | Age: 30
End: 2025-04-24
Payer: COMMERCIAL

## 2025-04-28 DIAGNOSIS — I10 HYPERTENSION, UNSPECIFIED TYPE: Primary | ICD-10-CM

## 2025-04-28 RX ORDER — LABETALOL 100 MG/1
TABLET, FILM COATED ORAL
Qty: 60 TABLET | Refills: 0 | Status: SHIPPED | OUTPATIENT
Start: 2025-04-28

## 2025-05-09 ENCOUNTER — OFFICE VISIT (OUTPATIENT)
Dept: NEUROLOGY | Facility: CLINIC | Age: 30
End: 2025-05-09
Payer: COMMERCIAL

## 2025-05-09 VITALS
RESPIRATION RATE: 18 BRPM | SYSTOLIC BLOOD PRESSURE: 120 MMHG | HEART RATE: 92 BPM | WEIGHT: 260.5 LBS | DIASTOLIC BLOOD PRESSURE: 86 MMHG | BODY MASS INDEX: 43.4 KG/M2 | HEIGHT: 65 IN

## 2025-05-09 DIAGNOSIS — G43.709 CHRONIC MIGRAINE WITHOUT AURA WITHOUT STATUS MIGRAINOSUS, NOT INTRACTABLE: ICD-10-CM

## 2025-05-09 PROCEDURE — 99999 PR PBB SHADOW E&M-EST. PATIENT-LVL III: CPT | Mod: PBBFAC,,, | Performed by: NURSE PRACTITIONER

## 2025-05-09 RX ORDER — SUMATRIPTAN SUCCINATE 100 MG/1
TABLET ORAL
Qty: 10 TABLET | Refills: 11 | Status: SHIPPED | OUTPATIENT
Start: 2025-05-09

## 2025-05-09 RX ORDER — TOPIRAMATE 50 MG/1
50 TABLET, FILM COATED ORAL 2 TIMES DAILY
Qty: 60 TABLET | Refills: 11 | Status: SHIPPED | OUTPATIENT
Start: 2025-05-30 | End: 2026-05-30

## 2025-05-09 RX ORDER — TOPIRAMATE 25 MG/1
TABLET ORAL
Qty: 70 TABLET | Refills: 0 | Status: SHIPPED | OUTPATIENT
Start: 2025-05-09

## 2025-05-09 NOTE — PROGRESS NOTES
Date of service: 5/9/2025  Referring provider: Dr. Gloria Gonzalez    Subjective:      Chief complaint: Headache       Patient ID: Trista Negrete is a 29 y.o. who presents today as a new patient for headache.     History of Present Illness    ORIGINAL HEADACHE HISTORY -   Age at onset and course over time: High school. She reports gradual increase in frequent and intensity over the last 2 years. Today, she reports headache 1-2 times per week with escalation at least 50% of the time. She is currently breastfeeding with plans to wean in the next two weeks. She has a one and two and half year old.     Location: orbital, frontal   Quality:  [] Stabbing [x] Pressure [x] Tight [x] Throbbing/pounding [] Sharp    Duration: [] Seconds [] Minutes [x] Hours [x] Days [] Constant   Frequency: [] Daily [x] Weekly 1-2x  [] Monthly   How many days per month is your head or neck 100% pain free: 20  Headaches awaken at night?:   1-2  Worst time of day: morning, mid-day   Intensity of pain: at best 1/10, at worst 8/10  Associated with: [x] Photophobia [x]  Phonophobia [x] Osmophobia [] Loss of appetite [x] Nausea [] Vomiting   [x] Dizziness [] Vertigo [] Ringing in the ears [] Blurry vision [] Double vision  [] Anxiety/Anger/Irritability [x] Problems with concentration [x] Problems with memory [x] Problems with task completion   [] Problems with relaxation [x] Neck tightness/ neck pain [x] Nasal congestion [] Nasal or sinus pressure [] Aura   Alleviated by:  [x] Sleep [] Darkness [x] Local pressure [] Massage [x] Heat [x] Ice [] Menses [x] Medication  Exacerbated by:  [] Fatigue [x] Light [x] Noise [] Smells [x] Coughing [x] Sneezing  [x] Bending over [x] Change in weather [x] Ovulation [] Menses [] Alcohol [x] Stress []  Food  Ipsilateral autonomic: [] nasal congestion [] lacrimation [] ptosis [] injection [] edema [] foreign body sensation [] ear fullness   ICP:  [] transient visual obscurations  [] tinnitus   [] positional  headache  [] non-positional     Bowl Habits: [] Normal [] Constipation [] Diarrhea   Caffeine intake: 200 mg - Diet Coke   Sleep habits: trouble falling asleep, trouble staying asleep, un-refreshed sleep, un-refreshed sleep   Water intake: 1 gallon    Eye Exam: appointment in    Family history of migraine: mother   Gyn status (if female) (birth control with estrogen, hysterectomy): having periods, breastfeeding   History of asthma, cancer, glaucoma, kidney stones, CVA and osteoporosis: exercise induced asthma (inhaler 1-2 times per year)     HIT 6: 66    Current acute treatment:  Ibuprofen  Tylenol     Current prevention:  Labetalol     Previously tried/failed acute treatment:  Tylneol  Motrin  Maxalt    Previously tried/failed preventative treatment:  None     Considerations:     Review of patient's allergies indicates:   Allergen Reactions    Neosporin (cvx-cpj-yfmhg) [neomycin-bacitracnzn-polymyxnb]     Penicillins Rash     Current Medications[1]    Past Medical History  Past Medical History:   Diagnosis Date    Migraine     Mononucleosis     PCOS (polycystic ovarian syndrome)     Strep throat        Past Surgical History  Past Surgical History:   Procedure Laterality Date    ADENOIDECTOMY       SECTION N/A 2022    Procedure:  SECTION;  Surgeon: Tracy Allen MD;  Location: [facility] L&D;  Service: OB/GYN;  Laterality: N/A;     SECTION N/A 2024    Procedure:  SECTION;  Surgeon: Tracy Allen MD;  Location: [facility] L&D;  Service: OB/GYN;  Laterality: N/A;     SECTION  22    CHOLECYSTECTOMY      TONSILLECTOMY      WISDOM TOOTH EXTRACTION         Family History  Family History   Problem Relation Name Age of Onset    Asthma Mother Marcella     Hypertension Father Jeff     No Known Problems Sister      Cancer Maternal Grandmother Trena         breast    Stroke Maternal Grandfather Nader     Cancer Paternal Grandmother Jeannie Laughlin        colon    Hypertension  Paternal Grandmother Jeannie     Stroke Paternal Grandmother Jeannie     Cancer Paternal Grandfather Mana     No Known Problems Son      Cancer Paternal Aunt Isatu         multiple- lung, brain    Breast cancer Paternal Aunt Isatu 50       Social History  Social History[2]     Review of Systems  14-point review of systems as follows:   No check bob indicates NEGATIVE response   Constitutional: [] weight loss [] change to appetite   Eyes: [] change in vision [] double vision   Ears, nose, mouth, throat: [] frequent nose bleeds [] ringing in the ears   Respiratory: [x] cough [] wheezing   Cardiovascular: [] chest pain [] palpitations   Gastrointestinal: [] jaundice [] nausea/vomiting   Genitourinary: [] incontinence [] burning with urination   Hematologic/lymphatic: [] easy bruising/bleeding [] night sweats   Neurological: [] numbness [] weakness   Endocrine: [x] fatigue [] heat/cold intolerance   Allergy/Immunologic: [] fevers [] chills   Musculoskeletal: [] muscle pain [] joint pain   Psychiatric: [] thoughts of harming self/others [] depression   Integumentary: [] rashes [] sores that do not heal     Objective:        Vitals:    05/09/25 0950   BP: 120/86   Pulse: 92   Resp: 18     Body mass index is 43.35 kg/m².    Constitutional: appears in no acute distress, well-developed, well-nourished     Eyes: normal conjunctiva, PERRLA    Ears, nose, mouth, throat: external appearance of ears and nose normal, hearing intact     Cardiovascular: n/a     Respiratory: unlabored respirations    Gastrointestinal: no visible abdominal masses, no guarding, no visible hernia    Musculoskeletal: normal tone in all four extremities. No abnormal movements. No pronator drift. No orbit. Symmetric finger tapping. Normal station. Normal regular gait.       Spine:   CERVICAL SPINE:  ROM: normal   MUSCLE SPASM: no   FACET LOADING: no   SPURLING: no  HINA / GUZMAN tender: no     Psychiatric: normal judgment and insight. Oriented to  person, place, and time.     Neurologic:   Cortical functions: recent and remote memory intact, normal attention span and concentration, speech fluent, adequate fund of knowledge   Cranial nerves: visual fields full, PERRLA, EOMI, symmetric facial strength, hearing intact, palate elevates symmetrically, shoulder shrug 5/5, tongue protrudes midline   Reflexes: 2+ in the upper and lower extremities, no Dash  Sensation: intact to temperature throughout   Coordination: normal finger to nose, heel to shin, tandem gait     Data Review:     I have personally reviewed the referring provider's notes, labs, & imaging made available to me today.      RADIOLOGY STUDIES:  I have personally reviewed the pertinent images performed.       No results found for this or any previous visit.    Lab Results   Component Value Date     04/14/2025     03/10/2023    K 4.4 04/14/2025    K 3.8 03/10/2023     04/14/2025     03/10/2023    CO2 26 04/14/2025    CO2 25 03/10/2023    BUN 8 04/14/2025    CREATININE 0.6 04/14/2025    GLU 97 04/14/2025     (H) 03/10/2023    HGBA1C 5.1 04/14/2025    AST 19 04/14/2025    AST 14 03/10/2023    ALT 22 04/14/2025    ALT 17 03/10/2023    ALBUMIN 3.3 (L) 04/14/2025    ALBUMIN 3.8 03/10/2023    PROT 7.4 04/14/2025    PROT 7.4 03/10/2023    BILITOT 0.3 04/14/2025    BILITOT 0.3 03/10/2023    CHOL 154 04/14/2025    CHOL 162 03/10/2023    HDL 45 04/14/2025    LDLCALC 69.6 04/14/2025    TRIG 197 (H) 04/14/2025    TRIG 129 03/10/2023       Lab Results   Component Value Date    WBC 10.80 04/14/2025    HGB 13.3 04/14/2025    HCT 40.5 04/14/2025    MCV 89 04/14/2025     04/14/2025       Lab Results   Component Value Date    TSH 1.991 04/14/2025           Assessment & Plan:       Problem List Items Addressed This Visit       Chronic migraine without aura without status migrainosus, not intractable    Overview   Headaches are typically unilateral, moderate to severe in intensity,  worsen with activity, pounding in quality and associated with sensitivity to light and sound.     Gradual progression pattern, lack of red flag features on history, and normal neurological exam are reassuring for primary as opposed to secondary etiology of headaches thus imaging will not be pursued for this history and this exam at this time. Consider in the future if refractory to treatment     Ok to start Labetalol as prescribed by another provider. Start Magnesium and Riboflavin daily. Start Topamax with titration to 50 mg twice daily ---- once done breastfeeding. Start Fioricet as needed. Take 1 tablet as needed. Ok to repeat dose 4 hours later. No more than two tablets per 24 hours -- while breastfeeding. Start Imitrex 100 mg as needed. Ok to repeat dose 2 hours later. No more than 200 mg per 24 hours -- once done breastfeeding. Headache journal. Decrease daily Diet Coke intake            Relevant Medications    sumatriptan (IMITREX) 100 MG tablet    topiramate (TOPAMAX) 25 MG tablet    topiramate (TOPAMAX) 50 MG tablet (Start on 5/30/2025)           Please call our clinic at [phone] or send a message on the [employer] portal if there are any changes to the plan described below, for example,if you are not contacted for the requested tests, referral(s) within one week, if you are unable to receive the medications prescribed, or if you feel you need to change the treatment course for any reason.     TESTING: none at this time but consider in the future if refractory to treatment     REFERRALS: none     PREVENTION (use daily regardless of headache):  - Ok to start Labetalol as prescribed by another provider  - Start Magnesium in ONE of the following preparations -               1. Magnesium oxide 800mg nightly (the most common over the counter kind, may causes loose stools)              2. Magnesium citrate 400-500mg nightly (harder to find, but more neutral on the bowels)              3. Magnesium glycinate  400mg nightly (hardest to find, look online, but most bowel-neutral, best absorbed)   - Start Riboflavin daily  - Start Topamax as follows: 25mg at bedtime for 1 week, then increase to 50mg at bedtime for 1 week, then increase to 25mg in the morning and 50mg at bedtime for 1 week, then increase to 50mg twice a day and continue at this dose. ---- once done breastfeeding   Side effects and adverse reactions to Topamax (Topiramate) were discussed with patient including: risk of weight loss, acral and perioral paresthesias, kidney stones, depression, cognitive dysfunction, vision changes, taste changes, hair loss, and rash , among others  If patient develops cognitive side effects we will consider addition of Sodium Bicarbonate 650mg three times a day.   - Consider Effexor, monoclonal +/- Botox in the future    AS-NEEDED TREATMENT (use total no more than 10 days per month unless otherwise stated):  - Start Fioricet as needed. Take 1 tablet as needed. Ok to repeat dose 4 hours later. No more than two tablets per 24 hours -- while breastfeeding  - Start Imitrex 100 mg as needed. Ok to repeat dose 2 hours later. No more than 200 mg per 24 hours -- once done breastfeeding     OTHER:   - Headache journal  - Decrease daily Diet Coke intake     Follow up in about 8 weeks (around 7/4/2025).       JOSH York      I have spent 60 minutes of total time on the total encounter which includes face to face time and non-face to face time preparing to see the patient (eg, review of labs, previous encounters, care everywhere), obtaining and/or reviewing separately obtained history, documenting clinical information in the electronic or health record, independently interpreting results, and communicating results to the patient/family/caregiver, or care coordination.        [1]   Current Outpatient Medications   Medication Sig Dispense Refill    azelastine (ASTELIN) 137 mcg (0.1 %) nasal spray 1 spray (137 mcg total) by Nasal route  2 (two) times daily as needed for Rhinitis. 30 mL 0    fluticasone propionate (FLONASE) 50 mcg/actuation nasal spray 1 spray (50 mcg total) by Each Nostril route daily as needed for Rhinitis or Allergies. 11.1 mL 0    labetaloL (NORMODYNE) 100 MG tablet Take 1/2 tablet twice daily. Hold for BP <110 systolic, heart rate <70. 60 tablet 0    levocetirizine (XYZAL) 5 MG tablet Take 5 mg by mouth every evening.      albuterol (PROVENTIL HFA) 90 mcg/actuation inhaler Inhale 2 puffs into the lungs every 6 (six) hours as needed for Wheezing. Rescue 18 g 0    sumatriptan (IMITREX) 100 MG tablet 1 tab PO PRN migraine. May repeat once in 2 hours, no more than 2 tab in 24 hours. Use no more than 10 days per month. 10 tablet 11    topiramate (TOPAMAX) 25 MG tablet 1 tab PO nightly x 1 week, then 1 tab PO BID x 1 week. Continue to increase by 1 tab weekly until 4 tabs BID is reached. 70 tablet 0    [START ON 5/30/2025] topiramate (TOPAMAX) 50 MG tablet Take 1 tablet (50 mg total) by mouth 2 (two) times daily. 60 tablet 11     No current facility-administered medications for this visit.   [2]   Social History  Socioeconomic History    Marital status:      Spouse name: Edmund    Number of children: 1   Occupational History    Occupation: medical assistant   Tobacco Use    Smoking status: Never    Smokeless tobacco: Never   Substance and Sexual Activity    Alcohol use: Not Currently     Comment: Occasional    Drug use: No    Sexual activity: Yes     Partners: Male   Social History Narrative    Lives with spouse and son.  From here.     Social Drivers of Health     Food Insecurity: No Food Insecurity (7/4/2024)    Hunger Vital Sign     Worried About Running Out of Food in the Last Year: Never true     Ran Out of Food in the Last Year: Never true   Transportation Needs: No Transportation Needs (7/4/2024)    TRANSPORTATION NEEDS     Transportation : No   Housing Stability: Unknown (7/4/2024)    Housing Stability Vital Sign      Unable to Pay for Housing in the Last Year: No     Homeless in the Last Year: No

## 2025-05-09 NOTE — PATIENT INSTRUCTIONS
Please call our clinic at [phone] or send a message on the [employer] portal if there are any changes to the plan described below, for example,if you are not contacted for the requested tests, referral(s) within one week, if you are unable to receive the medications prescribed, or if you feel you need to change the treatment course for any reason.     TESTING: none at this time but consider in the future if refractory to treatment     REFERRALS: none     PREVENTION (use daily regardless of headache):  - Ok to start Labetalol as prescribed by another provider  - Start Magnesium in ONE of the following preparations -               1. Magnesium oxide 800mg nightly (the most common over the counter kind, may causes loose stools)              2. Magnesium citrate 400-500mg nightly (harder to find, but more neutral on the bowels)              3. Magnesium glycinate 400mg nightly (hardest to find, look online, but most bowel-neutral, best absorbed)   - Start Riboflavin daily  - Start Topamax as follows: 25mg at bedtime for 1 week, then increase to 50mg at bedtime for 1 week, then increase to 25mg in the morning and 50mg at bedtime for 1 week, then increase to 50mg twice a day and continue at this dose. ---- once done breastfeeding   Side effects and adverse reactions to Topamax (Topiramate) were discussed with patient including: risk of weight loss, acral and perioral paresthesias, kidney stones, depression, cognitive dysfunction, vision changes, taste changes, hair loss, and rash , among others  If patient develops cognitive side effects we will consider addition of Sodium Bicarbonate 650mg three times a day.   - Consider Effexor, monoclonal +/- Botox in the future    AS-NEEDED TREATMENT (use total no more than 10 days per month unless otherwise stated):  - Start Fioricet as needed. Take 1 tablet as needed. Ok to repeat dose 4 hours later. No more than two tablets per 24 hours -- while breastfeeding  - Start Imitrex 100  mg as needed. Ok to repeat dose 2 hours later. No more than 200 mg per 24 hours -- once done breastfeeding     OTHER:   - Headache journal  - Decrease daily Diet Coke intake

## 2025-06-09 ENCOUNTER — E-VISIT (OUTPATIENT)
Dept: URGENT CARE | Facility: CLINIC | Age: 30
End: 2025-06-09
Payer: COMMERCIAL

## 2025-06-09 DIAGNOSIS — J32.0 MAXILLARY SINUSITIS, UNSPECIFIED CHRONICITY: Primary | ICD-10-CM

## 2025-06-09 RX ORDER — CLINDAMYCIN HYDROCHLORIDE 300 MG/1
300 CAPSULE ORAL 3 TIMES DAILY
Qty: 21 CAPSULE | Refills: 0 | Status: SHIPPED | OUTPATIENT
Start: 2025-06-09 | End: 2025-06-16

## 2025-06-09 RX ORDER — OXYMETAZOLINE HCL 0.05 %
2 SPRAY, NON-AEROSOL (ML) NASAL 2 TIMES DAILY
Qty: 6 ML | Refills: 0 | Status: SHIPPED | OUTPATIENT
Start: 2025-06-09 | End: 2025-06-12

## 2025-06-09 RX ORDER — NAPROXEN 500 MG/1
500 TABLET ORAL 2 TIMES DAILY PRN
Qty: 14 TABLET | Refills: 0 | Status: SHIPPED | OUTPATIENT
Start: 2025-06-09 | End: 2025-06-16

## 2025-06-09 NOTE — PROGRESS NOTES
Patient ID: Trista Negrete is a 29 y.o. female.    Chief Complaint: General Illness (Entered automatically based on patient selection in [facility].)          274}  The patient initiated a request through [facility] on 6/9/2025 for evaluation and management with a chief complaint of General Illness (Entered automatically based on patient selection in [facility].)     I evaluated the questionnaire submission on 06/09/2025 .    Total Time (in minutes): 12     [facility] P[facility] Evisit Supergroup-Common Problems    6/9/2025  1:06 PM CDT - Filed by Patient   What do you need help with? Sinus Infection   Do you agree to participate in an E-Visit? Yes   If you have any of the following symptoms, go to your local emergency room or call 911: I acknowledge   Do you have any of the following pregnancy-related conditions? None   What is the main issue you would like addressed today? Facial pain, teeth pain, green and yellow mucus after antibiotic given on 6/30   Do you think you might have COVID-19 or the Flu? No   What symptoms do you currently have?  Cough;  Headache;  Stuffy nose;  Face and nose pain   Describe your cough: Contains mucus   Describe your mucus: Green;  Yellow;  Thick   Have you ever smoked? Never smoked   Do you have a fever? No   When did your concern begin? 5/30/2025   In the last two weeks, have you been in close contact with someone who has COVID-19, the Flu, or strep throat? Yes   What have you tried to help your symptoms? Cold medication;  Cough syrup;  Drinking more fluids;  Hot shower;  Rest and sleep;  Other   List what you have done or taken to help your symptoms. I went to doctor on 5/30 and received an antibiotic for bronchitis and possibly walking pnuemonia (she didnt tell me my diagnosis at urgent care). Even after meds ended, thick green mucus and facial/teeth pain remain   On a scale of 1-10, where 10 is the worst you can imagine, how severe are your symptoms? (range: 1 - 10) 5   How have your symptoms  changed since they first started? No change   Do you have transportation to an [facility] location to get tested for COVID-19? Yes   Provide any additional information you feel is important. I was given doxycycline 5/30 and completed the 7 day dose. My mucus has continued or worsened. Its still green and yellow and i am blowing it out or coughing it out constantly throughout the day. Since Thursday, brendon had headaches and facial pain. My teeth have also been hurting. My cough and wheezing from 5/30 improved but the mucus did not.   Please attach any relevant images or files    Are you able to take your vital signs? Yes   Systolic Blood Pressure: 126   Diastolic Blood Pressure: 85   Weight: 258   Height: 64   Pulse: 82   Temperature: 98.3   Respiration rate: 16   Pulse Oxygen: 99          Active Problem List with Overview Notes    Diagnosis Date Noted    Chronic migraine without aura without status migrainosus, not intractable 05/09/2025     Headaches are typically unilateral, moderate to severe in intensity, worsen with activity, pounding in quality and associated with sensitivity to light and sound.     Gradual progression pattern, lack of red flag features on history, and normal neurological exam are reassuring for primary as opposed to secondary etiology of headaches thus imaging will not be pursued for this history and this exam at this time. Consider in the future if refractory to treatment     Ok to start Labetalol as prescribed by another provider. Start Magnesium and Riboflavin daily. Start Topamax with titration to 50 mg twice daily ---- once done breastfeeding. Start Fioricet as needed. Take 1 tablet as needed. Ok to repeat dose 4 hours later. No more than two tablets per 24 hours -- while breastfeeding. Start Imitrex 100 mg as needed. Ok to repeat dose 2 hours later. No more than 200 mg per 24 hours -- once done breastfeeding. Headache journal. Decrease daily Diet Coke intake         Severe obesity 04/14/2025     Maternal care due to low transverse uterine scar from previous  delivery 2024    Asthma, exercise induced 2022    Otitis externa of left ear 08/15/2018    Perforation of left tympanic membrane 08/15/2018    Acute serous otitis media of left ear 08/15/2018    Chronic seasonal allergic rhinitis due to pollen 2018    PCOS (polycystic ovarian syndrome) 2018      Recent Labs Obtained:  Lab Results   Component Value Date    WBC 10.80 2025    HGB 13.3 2025    HCT 40.5 2025    MCV 89 2025     2025     2025    K 4.4 2025    GLU 97 2025    CREATININE 0.6 2025    EGFRNORACEVR >60 2025    HGBA1C 5.1 2025    TSH 1.991 2025      Review of patient's allergies indicates:   Allergen Reactions    Neosporin (vkw-sri-mwpoc) [neomycin-bacitracnzn-polymyxnb]     Penicillins Rash       Encounter Diagnosis   Name Primary?    Maxillary sinusitis, unspecified chronicity Yes        No orders of the defined types were placed in this encounter.     Medications Ordered This Encounter   Medications    clindamycin (CLEOCIN) 300 MG capsule     Sig: Take 1 capsule (300 mg total) by mouth 3 (three) times daily. for 7 days     Dispense:  21 capsule     Refill:  0    naproxen (NAPROSYN) 500 MG tablet     Sig: Take 1 tablet (500 mg total) by mouth 2 (two) times daily as needed (pain).     Dispense:  14 tablet     Refill:  0    oxymetazoline (AFRIN, OXYMETAZOLINE,) 0.05 % nasal spray     Si sprays by Nasal route 2 (two) times daily. Do not take over 3 days due to rebound congestion for 3 days     Dispense:  6 mL     Refill:  0    Penicillin allergy thus limited will send clinda rec probiotic while taking and if no improvement in 48 hours in person exam.     E-Visit Time Tracking:    Day 1 Time (in minutes): 12    Total Time (in minutes): 12      274}

## 2025-06-30 ENCOUNTER — OFFICE VISIT (OUTPATIENT)
Dept: OPTOMETRY | Facility: CLINIC | Age: 30
End: 2025-06-30
Payer: COMMERCIAL

## 2025-06-30 DIAGNOSIS — H52.13 MYOPIA OF BOTH EYES WITH ASTIGMATISM: ICD-10-CM

## 2025-06-30 DIAGNOSIS — Z00.00 PREVENTATIVE HEALTH CARE: ICD-10-CM

## 2025-06-30 DIAGNOSIS — D31.32 NEVUS OF CHOROID OF LEFT EYE: ICD-10-CM

## 2025-06-30 DIAGNOSIS — H52.203 MYOPIA OF BOTH EYES WITH ASTIGMATISM: ICD-10-CM

## 2025-06-30 DIAGNOSIS — G43.009 MIGRAINE WITHOUT AURA AND WITHOUT STATUS MIGRAINOSUS, NOT INTRACTABLE: Primary | ICD-10-CM

## 2025-06-30 PROCEDURE — 92004 COMPRE OPH EXAM NEW PT 1/>: CPT | Mod: S$GLB,,, | Performed by: OPTOMETRIST

## 2025-06-30 PROCEDURE — 99999 PR PBB SHADOW E&M-EST. PATIENT-LVL III: CPT | Mod: PBBFAC,,, | Performed by: OPTOMETRIST

## 2025-06-30 PROCEDURE — 92015 DETERMINE REFRACTIVE STATE: CPT | Mod: S$GLB,,, | Performed by: OPTOMETRIST

## 2025-06-30 PROCEDURE — 1160F RVW MEDS BY RX/DR IN RCRD: CPT | Mod: CPTII,S$GLB,, | Performed by: OPTOMETRIST

## 2025-06-30 PROCEDURE — 3044F HG A1C LEVEL LT 7.0%: CPT | Mod: CPTII,S$GLB,, | Performed by: OPTOMETRIST

## 2025-06-30 PROCEDURE — 1159F MED LIST DOCD IN RCRD: CPT | Mod: CPTII,S$GLB,, | Performed by: OPTOMETRIST

## 2025-06-30 NOTE — PROGRESS NOTES
HPI    Pt presents today as a new pt for a dfe.  Pts last ocular exam was in 2017 in [place].  Pt c/o HA's and some mild blurriness. Neuro recommend exam.  Does not wear any corrective lenses.  Does not instill gtts.  Denies ocular pain/disc.  Denies new or worsneing F/F        Last edited by Florence Casillas on 6/30/2025 10:36 AM.            Assessment /Plan     For exam results, see Encounter Report.    Migraine without aura and without status migrainosus, not intractable  -     Ambulatory referral/consult to Optometry    Preventative health care  -     Ambulatory referral/consult to Optometry    Myopia of both eyes with astigmatism    Nevus of choroid of left eye      1,2. No ocular origin for headaches. Patient to follow up with PCP for further testing and evaluation of headaches.  3. New Spectacle Rx given, discussed different options for glasses. RTC 1 year routine eye exam.  4. Monitor condition. Patient to report any changes. RTC 1 year recheck.

## 2025-07-20 ENCOUNTER — PATIENT MESSAGE (OUTPATIENT)
Dept: FAMILY MEDICINE | Facility: CLINIC | Age: 30
End: 2025-07-20
Payer: COMMERCIAL

## 2025-07-21 ENCOUNTER — E-VISIT (OUTPATIENT)
Dept: URGENT CARE | Facility: CLINIC | Age: 30
End: 2025-07-21
Payer: COMMERCIAL

## 2025-07-21 DIAGNOSIS — B35.1 ONYCHOMYCOSIS: Primary | ICD-10-CM

## 2025-07-21 RX ORDER — TERBINAFINE HYDROCHLORIDE 250 MG/1
250 TABLET ORAL DAILY
Qty: 84 TABLET | Refills: 0 | Status: SHIPPED | OUTPATIENT
Start: 2025-07-21 | End: 2025-10-13

## 2025-07-21 NOTE — PROGRESS NOTES
Patient ID: Trista Negrete is a 30 y.o. female.        E-Visit Time Tracking:   Day 1 Time (in minutes): 6  Total Time (in minutes): 6      Chief Complaint: General Illness (Entered automatically based on patient selection in [employer].)      The patient initiated a request through [employer] on 7/21/2025 for evaluation and management with a chief complaint of General Illness (Entered automatically based on patient selection in [employer].)     I evaluated the questionnaire submission on 07/21/2025.    [facility] Peq Evisit General    7/21/2025  8:57 AM CDT - Filed by Patient   Do you agree to participate in an E-Visit? Yes   If you have any of the following symptoms, please go to the nearest emergency room or call 911: I acknowledge   Choose the state of your primary residence [place]   Do you have any of the following pregnancy-related conditions? (Pregnant, Possibly pregnant, Breast feeding, None) None   What is the main issue you would like addressed today? Toenail fungus   Please describe your symptoms. Two toenails are yellow and thick. Tried OTC meds with no luck.   Where is your problem located? Right foot, two toes   On a scale of 1-10, where 10 is the worst you can imagine, how severe are your symptoms? (range: 1 - 10) 1   Have you had these symptoms before? No   How long have you been having these symptoms? (Just today, For a few days, For a week, For one to four weeks, For more than a month) More than a month   What helps with your symptoms? Nothing   What makes your symptoms feel worse? Nothing   Are these symptoms related to a condition that you currently have? (Yes, No, Not sure) No   Please describe any probable cause for your symptoms. Toenail fungus. Dont know what would cause that   Provide any information you feel is important to your history not asked above    Please attach any relevant images or files    Are you able to take your vitals? No         Encounter Diagnosis   Name Primary?    Onychomycosis  Yes        No orders of the defined types were placed in this encounter.     Medications Ordered This Encounter   Medications    terbinafine HCL (LAMISIL) 250 mg tablet     Sig: Take 1 tablet (250 mg total) by mouth once daily.     Dispense:  84 tablet     Refill:  0        No follow-ups on file.

## 2025-08-09 ENCOUNTER — OFFICE VISIT (OUTPATIENT)
Dept: URGENT CARE | Facility: CLINIC | Age: 30
End: 2025-08-09
Payer: COMMERCIAL

## 2025-08-09 VITALS
BODY MASS INDEX: 43.32 KG/M2 | SYSTOLIC BLOOD PRESSURE: 122 MMHG | HEIGHT: 65 IN | TEMPERATURE: 98 F | WEIGHT: 260 LBS | OXYGEN SATURATION: 97 % | RESPIRATION RATE: 18 BRPM | HEART RATE: 88 BPM | DIASTOLIC BLOOD PRESSURE: 85 MMHG

## 2025-08-09 DIAGNOSIS — R05.9 COUGH, UNSPECIFIED TYPE: ICD-10-CM

## 2025-08-09 DIAGNOSIS — J06.9 VIRAL URI WITH COUGH: Primary | ICD-10-CM

## 2025-08-09 LAB
CTP QC/QA: YES
SARS-COV+SARS-COV-2 AG RESP QL IA.RAPID: NEGATIVE

## 2025-08-09 PROCEDURE — 87811 SARS-COV-2 COVID19 W/OPTIC: CPT | Mod: QW,S$GLB,, | Performed by: NURSE PRACTITIONER

## 2025-08-09 PROCEDURE — 99214 OFFICE O/P EST MOD 30 MIN: CPT | Mod: S$GLB,,, | Performed by: NURSE PRACTITIONER

## 2025-08-09 RX ORDER — PREDNISONE 20 MG/1
20 TABLET ORAL DAILY
Qty: 5 TABLET | Refills: 0 | Status: SHIPPED | OUTPATIENT
Start: 2025-08-09 | End: 2025-08-14

## 2025-08-09 NOTE — PATIENT INSTRUCTIONS
- You received a steroid RX today.  This can elevate your blood pressure, elevate your blood sugar, water weight gain, nervous energy, redness to the face and dimpling of the skin where the shot goes in.   - Do not use steroids more than 3 times per year.   - If you have diabetes, please check you blood sugar frequently.  - If you have high blood pressure, please check your blood pressure frequently.     INSTRUCTIONS:  - Rest.  - Drink plenty of fluids.  - Take Tylenol and/or Ibuprofen as directed as needed for fever/pain.  Do not take more than the recommended dose.  - follow up with your PCP within the next 1-2 weeks as needed.  - You must understand that you have received an Urgent Care treatment only and that you may be released before all of your medical problems are known or treated.   - You, the patient, will arrange for follow up care as instructed.   - If your condition worsens or fails to improve we recommend that you receive another evaluation at the ER immediately or contact your PCP to discuss your concerns.   - You can call [phone] or [phone] to help schedule an appointment with the appropriate provider.     -If you smoke cigarettes, it would be beneficial for you to stop.    OVER THE COUNTER RECOMMENDATIONS/SUGGESTIONS.     Make sure to stay well hydrated.     Use Nasal Saline to mechanically move any post nasal drip from your eustachian tube or from the back of your throat.     Use warm salt water gargles to ease your throat pain. Warm salt water gargles as needed for sore throat-  1/2 tsp salt to 1 cup warm water, gargle as desired.     Use an antihistamine such as Claritin, Zyrtec or Allegra to dry you out.      Use pseudoephedrine (behind the counter) to decongest. Pseudoephedrine  30 mg up to 240 mg /day. It can raise your blood pressure and give you palpitations.     Use mucinex (guaifenisin) to break up mucous up to 2400mg/day to loosen any mucous.   The mucinex DM pill has a  cough suppressant that can be sedating. It can be used at night to stop the tickle at the back of your throat.  You can use Mucinex D (it has guaifenesin and a high dose of pseudoephedrine) in the mornings to help decongest.        Use Flonase 1-2 sprays/nostril per day. It is a local acting steroid nasal spray, if you develop a bloody nose, stop using the medication immediately.     Sometimes Nyquil at night is beneficial to help you get some rest, however it is sedating and it does have an antihistamine, and tylenol.     Honey is a natural cough suppressant that can be used.     Tylenol up to 4,000 mg a day is safe for short periods and can be used for body aches, pain, and fever. However in high doses and prolonged use it can cause liver irritation.     Ibuprofen is a non-steroidal anti-inflammatory that can be used for body aches, pain, and fever.However it can also cause stomach irritation if over used.

## 2025-08-09 NOTE — PROGRESS NOTES
"Subjective:      Patient ID: Trista Negrete is a 30 y.o. female.    Vitals:  height is 5' 5" (1.651 m) and weight is 117.9 kg (260 lb). Her oral temperature is 98 °F (36.7 °C). Her blood pressure is 122/85 and her pulse is 88. Her respiration is 18 and oxygen saturation is 97%.     Chief Complaint: Cough    Pt states that since last Friday she's been having productive cough, producing yellow/green mucus, chest congestion, and on/off sore throat. Pain level 4/10. Tx robitussin, night/dayquil, and elderberry syrup     Cough  This is a new problem. The current episode started 1 to 4 weeks ago. The problem has been gradually worsening. The problem occurs hourly. The cough is Productive of sputum. Associated symptoms include a sore throat and wheezing. Pertinent negatives include no chest pain, chills, ear pain, headaches, nasal congestion, postnasal drip, rash or shortness of breath. The symptoms are aggravated by lying down. She has tried OTC cough suppressant for the symptoms. The treatment provided no relief.       Constitution: Negative. Negative for chills, sweating and fatigue.   HENT:  Positive for sore throat. Negative for ear pain, facial swelling, congestion and postnasal drip.    Neck: Negative for painful lymph nodes.   Cardiovascular: Negative.  Negative for chest trauma, chest pain and sob on exertion.   Eyes: Negative.  Negative for eye itching and eye pain.   Respiratory:  Positive for cough and wheezing. Negative for chest tightness, shortness of breath and asthma.    Gastrointestinal: Negative.  Negative for nausea, vomiting and diarrhea.   Endocrine: negative. cold intolerance and excessive thirst.   Genitourinary: Negative.  Negative for dysuria, frequency, urgency and hematuria.   Musculoskeletal:  Negative for pain, trauma and joint pain.   Skin: Negative.  Negative for rash, wound and hives.   Allergic/Immunologic: Negative.  Negative for eczema, asthma, hives and itching.   Neurological: " Negative.  Negative for headaches, disorientation and altered mental status.   Hematologic/Lymphatic: Negative.  Negative for swollen lymph nodes.   Psychiatric/Behavioral: Negative.  Negative for altered mental status, disorientation and confusion.       Objective:     Physical Exam   Constitutional: She is oriented to person, place, and time. She appears well-developed. She is cooperative.  Non-toxic appearance. She does not appear ill. No distress.   HENT:   Head: Normocephalic and atraumatic.   Ears:   Right Ear: Hearing, tympanic membrane, external ear and ear canal normal. no impacted cerumen  Left Ear: Hearing, tympanic membrane, external ear and ear canal normal. no impacted cerumen  Nose: Mucosal edema and congestion present. No rhinorrhea or nasal deformity. No epistaxis. Right sinus exhibits no maxillary sinus tenderness and no frontal sinus tenderness. Left sinus exhibits no maxillary sinus tenderness and no frontal sinus tenderness.   Mouth/Throat: Uvula is midline, oropharynx is clear and moist and mucous membranes are normal. No trismus in the jaw. Normal dentition. No uvula swelling. No oropharyngeal exudate, posterior oropharyngeal edema, posterior oropharyngeal erythema, tonsillar abscesses or cobblestoning. Tonsils are 0 on the right. Tonsils are 0 on the left. No tonsillar exudate.   Eyes: Conjunctivae and lids are normal. No scleral icterus.   Neck: Trachea normal and phonation normal. Neck supple. No edema present. No erythema present. No neck rigidity present.   Cardiovascular: Normal rate, regular rhythm, normal heart sounds and normal pulses.   Pulmonary/Chest: Effort normal and breath sounds normal. No stridor. No respiratory distress. She has no decreased breath sounds. She has no wheezes. She has no rhonchi. She has no rales. She exhibits no tenderness.   Abdominal: Normal appearance.   Musculoskeletal: Normal range of motion.         General: No deformity. Normal range of motion.    Lymphadenopathy:     She has no cervical adenopathy.   Neurological: no focal deficit. She is alert, oriented to person, place, and time and at baseline. She exhibits normal muscle tone. Coordination normal.   Skin: Skin is warm, dry, intact, not diaphoretic and not pale.   Psychiatric: Her speech is normal and behavior is normal. Mood, judgment and thought content normal.   Nursing note and vitals reviewed.    Results for orders placed or performed in visit on 08/09/25   SARS Coronavirus 2 Antigen, POCT Manual Read    Collection Time: 08/09/25  8:54 AM   Result Value Ref Range    SARS Coronavirus 2 Antigen Negative Negative, Presumptive Negative     Acceptable Yes          Assessment:     1. Viral URI with cough    2. Cough, unspecified type        Plan:   COVID negative.  Discussed with patient that current symptoms are likely viral in nature.  Discussed the progression and duration of a virus.  Discussed OTC medications to treat for symptoms at home.  Encouraged plenty of fluids and rest.  Discussed vitamin supplementation such as vitamin-C may help lessen duration of symptoms.  Recommended PCP follow-up or return to urgent care for re-evaluation if no improvement.  Patient verbalized understanding of all discussed.     Patient's chart reviewed in detail including all recent visits, pertinent medical history, medications, allergies and recent labs prior to prescribing medications.    FOLLOWUP  Follow up if symptoms worsen or fail to improve, for PLEASE CONTACT PCP OR CONTACT THE EMERGENCY ROOM..     PATIENT INSTRUCTIONS  Patient Instructions   - You received a steroid RX today.  This can elevate your blood pressure, elevate your blood sugar, water weight gain, nervous energy, redness to the face and dimpling of the skin where the shot goes in.   - Do not use steroids more than 3 times per year.   - If you have diabetes, please check you blood sugar frequently.  - If you have high blood pressure,  please check your blood pressure frequently.     INSTRUCTIONS:  - Rest.  - Drink plenty of fluids.  - Take Tylenol and/or Ibuprofen as directed as needed for fever/pain.  Do not take more than the recommended dose.  - follow up with your PCP within the next 1-2 weeks as needed.  - You must understand that you have received an Urgent Care treatment only and that you may be released before all of your medical problems are known or treated.   - You, the patient, will arrange for follow up care as instructed.   - If your condition worsens or fails to improve we recommend that you receive another evaluation at the ER immediately or contact your PCP to discuss your concerns.   - You can call [phone] or [phone] to help schedule an appointment with the appropriate provider.     -If you smoke cigarettes, it would be beneficial for you to stop.    OVER THE COUNTER RECOMMENDATIONS/SUGGESTIONS.     Make sure to stay well hydrated.     Use Nasal Saline to mechanically move any post nasal drip from your eustachian tube or from the back of your throat.     Use warm salt water gargles to ease your throat pain. Warm salt water gargles as needed for sore throat-  1/2 tsp salt to 1 cup warm water, gargle as desired.     Use an antihistamine such as Claritin, Zyrtec or Allegra to dry you out.      Use pseudoephedrine (behind the counter) to decongest. Pseudoephedrine  30 mg up to 240 mg /day. It can raise your blood pressure and give you palpitations.     Use mucinex (guaifenisin) to break up mucous up to 2400mg/day to loosen any mucous.   The mucinex DM pill has a cough suppressant that can be sedating. It can be used at night to stop the tickle at the back of your throat.  You can use Mucinex D (it has guaifenesin and a high dose of pseudoephedrine) in the mornings to help decongest.        Use Flonase 1-2 sprays/nostril per day. It is a local acting steroid nasal spray, if you develop a bloody nose, stop using the  medication immediately.     Sometimes Nyquil at night is beneficial to help you get some rest, however it is sedating and it does have an antihistamine, and tylenol.     Honey is a natural cough suppressant that can be used.     Tylenol up to 4,000 mg a day is safe for short periods and can be used for body aches, pain, and fever. However in high doses and prolonged use it can cause liver irritation.     Ibuprofen is a non-steroidal anti-inflammatory that can be used for body aches, pain, and fever.However it can also cause stomach irritation if over used.                Thank you for allowing me to participate in your health care,     FNP-C     Viral URI with cough  -     predniSONE (DELTASONE) 20 MG tablet; Take 1 tablet (20 mg total) by mouth once daily. for 5 days  Dispense: 5 tablet; Refill: 0    Cough, unspecified type  -     SARS Coronavirus 2 Antigen, POCT Manual Read

## 2025-08-28 DIAGNOSIS — I10 HYPERTENSION, UNSPECIFIED TYPE: ICD-10-CM

## 2025-08-28 RX ORDER — LABETALOL 100 MG/1
TABLET, FILM COATED ORAL
Qty: 60 TABLET | Refills: 0 | Status: SHIPPED | OUTPATIENT
Start: 2025-08-28